# Patient Record
Sex: FEMALE | Race: WHITE | NOT HISPANIC OR LATINO | Employment: FULL TIME | URBAN - METROPOLITAN AREA
[De-identification: names, ages, dates, MRNs, and addresses within clinical notes are randomized per-mention and may not be internally consistent; named-entity substitution may affect disease eponyms.]

---
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
## 2022-08-06 ENCOUNTER — HOSPITAL ENCOUNTER (EMERGENCY)
Facility: HOSPITAL | Age: 22
Discharge: HOME | End: 2022-08-06
Attending: EMERGENCY MEDICINE
Payer: COMMERCIAL

## 2022-08-06 VITALS
OXYGEN SATURATION: 97 % | SYSTOLIC BLOOD PRESSURE: 108 MMHG | TEMPERATURE: 98.4 F | RESPIRATION RATE: 16 BRPM | WEIGHT: 115.08 LBS | DIASTOLIC BLOOD PRESSURE: 65 MMHG | HEART RATE: 70 BPM

## 2022-08-06 DIAGNOSIS — R55 NEAR SYNCOPE: Primary | ICD-10-CM

## 2022-08-06 LAB
ALBUMIN SERPL BCP-MCNC: 4.2 G/DL (ref 3.5–5)
ALP SERPL-CCNC: 50 U/L (ref 46–116)
ALT SERPL W P-5'-P-CCNC: 16 U/L (ref 12–78)
AMPHETAMINES SERPL QL SCN: NEGATIVE
ANION GAP SERPL CALCULATED.3IONS-SCNC: 6 MMOL/L (ref 4–13)
AST SERPL W P-5'-P-CCNC: 10 U/L (ref 5–45)
ATRIAL RATE: 61 BPM
BACTERIA UR QL AUTO: ABNORMAL /HPF
BARBITURATES UR QL: NEGATIVE
BASOPHILS # BLD AUTO: 0.01 THOUSANDS/ΜL (ref 0–0.1)
BASOPHILS NFR BLD AUTO: 0 % (ref 0–1)
BENZODIAZ UR QL: NEGATIVE
BILIRUB SERPL-MCNC: 0.56 MG/DL (ref 0.2–1)
BILIRUB UR QL STRIP: NEGATIVE
BUN SERPL-MCNC: 9 MG/DL (ref 5–25)
CALCIUM SERPL-MCNC: 8.9 MG/DL (ref 8.3–10.1)
CHLORIDE SERPL-SCNC: 105 MMOL/L (ref 96–108)
CLARITY UR: CLEAR
CO2 SERPL-SCNC: 25 MMOL/L (ref 21–32)
COCAINE UR QL: NEGATIVE
COLOR UR: YELLOW
CREAT SERPL-MCNC: 0.76 MG/DL (ref 0.6–1.3)
EOSINOPHIL # BLD AUTO: 0.02 THOUSAND/ΜL (ref 0–0.61)
EOSINOPHIL NFR BLD AUTO: 0 % (ref 0–6)
ERYTHROCYTE [DISTWIDTH] IN BLOOD BY AUTOMATED COUNT: 12.2 % (ref 11.6–15.1)
EXT PREG TEST URINE: NEGATIVE
EXT. CONTROL ED NAV: NORMAL
GFR SERPL CREATININE-BSD FRML MDRD: 113 ML/MIN/1.73SQ M
GLUCOSE SERPL-MCNC: 102 MG/DL (ref 65–140)
GLUCOSE UR STRIP-MCNC: NEGATIVE MG/DL
HCT VFR BLD AUTO: 38.7 % (ref 34.8–46.1)
HGB BLD-MCNC: 13.4 G/DL (ref 11.5–15.4)
HGB UR QL STRIP.AUTO: NEGATIVE
IMM GRANULOCYTES # BLD AUTO: 0.02 THOUSAND/UL (ref 0–0.2)
IMM GRANULOCYTES NFR BLD AUTO: 0 % (ref 0–2)
KETONES UR STRIP-MCNC: NEGATIVE MG/DL
LEUKOCYTE ESTERASE UR QL STRIP: ABNORMAL
LYMPHOCYTES # BLD AUTO: 1.39 THOUSANDS/ΜL (ref 0.6–4.47)
LYMPHOCYTES NFR BLD AUTO: 27 % (ref 14–44)
MAGNESIUM SERPL-MCNC: 2.1 MG/DL (ref 1.6–2.6)
MCH RBC QN AUTO: 32.3 PG (ref 26.8–34.3)
MCHC RBC AUTO-ENTMCNC: 34.6 G/DL (ref 31.4–37.4)
MCV RBC AUTO: 93 FL (ref 82–98)
METHADONE UR QL: NEGATIVE
MONOCYTES # BLD AUTO: 0.38 THOUSAND/ΜL (ref 0.17–1.22)
MONOCYTES NFR BLD AUTO: 7 % (ref 4–12)
NEUTROPHILS # BLD AUTO: 3.33 THOUSANDS/ΜL (ref 1.85–7.62)
NEUTS SEG NFR BLD AUTO: 66 % (ref 43–75)
NITRITE UR QL STRIP: NEGATIVE
NON-SQ EPI CELLS URNS QL MICRO: ABNORMAL /HPF
NRBC BLD AUTO-RTO: 0 /100 WBCS
OPIATES UR QL SCN: NEGATIVE
OXYCODONE+OXYMORPHONE UR QL SCN: NEGATIVE
P AXIS: 76 DEGREES
PCP UR QL: NEGATIVE
PH UR STRIP.AUTO: 7 [PH] (ref 4.5–8)
PHOSPHATE SERPL-MCNC: 3.4 MG/DL (ref 2.7–4.5)
PLATELET # BLD AUTO: 182 THOUSANDS/UL (ref 149–390)
PMV BLD AUTO: 11 FL (ref 8.9–12.7)
POTASSIUM SERPL-SCNC: 3.6 MMOL/L (ref 3.5–5.3)
PR INTERVAL: 112 MS
PROT SERPL-MCNC: 7.5 G/DL (ref 6.4–8.4)
PROT UR STRIP-MCNC: NEGATIVE MG/DL
QRS AXIS: 88 DEGREES
QRSD INTERVAL: 82 MS
QT INTERVAL: 402 MS
QTC INTERVAL: 404 MS
RBC # BLD AUTO: 4.15 MILLION/UL (ref 3.81–5.12)
RBC #/AREA URNS AUTO: ABNORMAL /HPF
SODIUM SERPL-SCNC: 136 MMOL/L (ref 135–147)
SP GR UR STRIP.AUTO: 1.02 (ref 1–1.03)
T WAVE AXIS: 65 DEGREES
THC UR QL: POSITIVE
UROBILINOGEN UR QL STRIP.AUTO: 1 E.U./DL
VENTRICULAR RATE: 61 BPM
WBC # BLD AUTO: 5.15 THOUSAND/UL (ref 4.31–10.16)
WBC #/AREA URNS AUTO: ABNORMAL /HPF

## 2022-08-06 PROCEDURE — 85025 COMPLETE CBC W/AUTO DIFF WBC: CPT | Performed by: EMERGENCY MEDICINE

## 2022-08-06 PROCEDURE — 81025 URINE PREGNANCY TEST: CPT | Performed by: EMERGENCY MEDICINE

## 2022-08-06 PROCEDURE — 80307 DRUG TEST PRSMV CHEM ANLYZR: CPT | Performed by: EMERGENCY MEDICINE

## 2022-08-06 PROCEDURE — 93010 ELECTROCARDIOGRAM REPORT: CPT | Performed by: INTERNAL MEDICINE

## 2022-08-06 PROCEDURE — 80053 COMPREHEN METABOLIC PANEL: CPT | Performed by: EMERGENCY MEDICINE

## 2022-08-06 PROCEDURE — 84100 ASSAY OF PHOSPHORUS: CPT | Performed by: EMERGENCY MEDICINE

## 2022-08-06 PROCEDURE — 99284 EMERGENCY DEPT VISIT MOD MDM: CPT

## 2022-08-06 PROCEDURE — 83735 ASSAY OF MAGNESIUM: CPT | Performed by: EMERGENCY MEDICINE

## 2022-08-06 PROCEDURE — 99285 EMERGENCY DEPT VISIT HI MDM: CPT | Performed by: EMERGENCY MEDICINE

## 2022-08-06 PROCEDURE — 81001 URINALYSIS AUTO W/SCOPE: CPT

## 2022-08-06 PROCEDURE — 96365 THER/PROPH/DIAG IV INF INIT: CPT

## 2022-08-06 PROCEDURE — 93005 ELECTROCARDIOGRAM TRACING: CPT

## 2022-08-06 PROCEDURE — 36415 COLL VENOUS BLD VENIPUNCTURE: CPT | Performed by: EMERGENCY MEDICINE

## 2022-08-06 RX ADMIN — SODIUM CHLORIDE, SODIUM LACTATE, POTASSIUM CHLORIDE, AND CALCIUM CHLORIDE 1000 ML: .6; .31; .03; .02 INJECTION, SOLUTION INTRAVENOUS at 12:02

## 2022-08-06 NOTE — ED PROVIDER NOTES
Pt Name: Milagro Ryan  MRN: [id number]  [place] 2000  Age/Sex: 23 y o  female  Date of evaluation: 8/6/2022  PCP: Dave Daily, [address] [place] [address]    Chief Complaint   Patient presents with    Syncope     [facility] to [facility]  Pt reports losing balance when she stands up too fast  Pt reports [facility] reports E[facility] abnormality  Pt denies any pain  Pt reports episodes more than three times a day  Alert and oriented         HPI    Rome Chiang presents to the Emergency Department complaining of several episodes of near syncope  [facility] and then [facility] to [facility] for further evaluation  She has been feeling like she is going to pass out when off and on several times a day  She reports "I not really awake but Im not on the floor either"  HPI      Past Medical and Surgical History    Past Medical History:   Diagnosis Date    Asthma     Headache        Past Surgical History:   Procedure Laterality Date    CHOLECYSTECTOMY      EYE SURGERY         History reviewed  No pertinent family history  Social History     Tobacco Use    Smoking status: Former Smoker    Smokeless tobacco: Never Used   Vaping Use    Vaping Use: Every day    Substances: Nicotine, THC   Substance Use Topics    Alcohol use: Not Currently    Drug use: Yes     Types: Marijuana     Comment: daily           Allergies    Allergies   Allergen Reactions    Lexapro [Escitalopram] Shortness Of Breath       Home Medications    Prior to Admission medications    Medication Sig Start Date End Date Taking?  Authorizing Provider   albuterol (Ventolin HFA) 90 mcg/act inhaler Inhale 2 puffs every 4 (four) hours as needed for wheezing 3/14/21   Darrold Shone , DO   butalbital-aspirin-caffeine [facility]) -40 mg per capsule Take 1 capsule by mouth every 8 (eight) hours as needed for headaches    Historical Provider, MD   cyclobenzaprine (FLEXERIL) 5 mg tablet TAKE 1 TABLET BY MOUTH AT BEDTIME AS NEEDED  12/5/16   Historical Provider, MD   fluticasone (Flovent HFA) 110 MCG/ACT inhaler Inhale 2 puffs 2 (two) times a day 3/14/21   Leelee Rolon DO   naproxen (NAPROSYN) 375 mg tablet Take 375 mg by mouth 2 (two) times a day 12/16/16 3/14/21  Historical Provider, MD           Review of Systems    Review of Systems   Constitutional: Negative for chills and fever  HENT: Negative for ear pain and sore throat  Eyes: Negative for pain and visual disturbance  Respiratory: Negative for cough and shortness of breath  Cardiovascular: Negative for chest pain and palpitations  Gastrointestinal: Negative for abdominal pain and vomiting  Genitourinary: Negative for dysuria and hematuria  Musculoskeletal: Negative for arthralgias and back pain  Skin: Negative for color change and rash  Neurological: Positive for dizziness and light-headedness  Negative for seizures and syncope  All other systems reviewed and are negative  Physical Exam      ED Triage Vitals [08/06/22 1112]   Temperature Pulse Respirations Blood Pressure SpO2   98 4 °F (36 9 °C) 101 16 119/77 95 %      Temp Source Heart Rate Source Patient Position - Orthostatic VS BP Location FiO2 (%)   Oral Monitor Sitting Right arm --      Pain Score       No Pain               Physical Exam  Vitals and nursing note reviewed  Constitutional:       General: She is not in acute distress  Appearance: She is well-developed  HENT:      Head: Normocephalic and atraumatic  Eyes:      Conjunctiva/sclera: Conjunctivae normal    Cardiovascular:      Rate and Rhythm: Normal rate and regular rhythm  Heart sounds: No murmur heard  Pulmonary:      Effort: Pulmonary effort is normal  No respiratory distress  Breath sounds: Normal breath sounds  Abdominal:      Palpations: Abdomen is soft  Tenderness: There is no abdominal tenderness  Musculoskeletal:      Cervical back: Neck supple     Skin: General: Skin is warm and dry  Neurological:      Mental Status: She is alert  Assessment and Plan    Leigh Curling is a 23 y o  female who presents with near syncope  Physical examination unremarkable  Plan will be to perform diagnostic testing and treat symptomatically  MDM    Diagnostic Results    EKG INTERPRETATION  EKG Interpretation    EKG interpreted by me  Interpretation by Reji Howell DO  EKG reviewed and interpreted independently      Labs:    Results for orders placed or performed during the hospital encounter of 08/06/22   CBC and differential   Result Value Ref Range    WBC 5 15 4 31 - 10 16 Thousand/uL    RBC 4 15 3 81 - 5 12 Million/uL    Hemoglobin 13 4 11 5 - 15 4 g/dL    Hematocrit 38 7 34 8 - 46 1 %    MCV 93 82 - 98 fL    MCH 32 3 26 8 - 34 3 pg    MCHC 34 6 31 4 - 37 4 g/dL    RDW 12 2 11 6 - 15 1 %    MPV 11 0 8 9 - 12 7 fL    Platelets 870 324 - 989 Thousands/uL    nRBC 0 /100 WBCs    Neutrophils Relative 66 43 - 75 %    Immat GRANS % 0 0 - 2 %    Lymphocytes Relative 27 14 - 44 %    Monocytes Relative 7 4 - 12 %    Eosinophils Relative 0 0 - 6 %    Basophils Relative 0 0 - 1 %    Neutrophils Absolute 3 33 1 85 - 7 62 Thousands/µL    Immature Grans Absolute 0 02 0 00 - 0 20 Thousand/uL    Lymphocytes Absolute 1 39 0 60 - 4 47 Thousands/µL    Monocytes Absolute 0 38 0 17 - 1 22 Thousand/µL    Eosinophils Absolute 0 02 0 00 - 0 61 Thousand/µL    Basophils Absolute 0 01 0 00 - 0 10 Thousands/µL   Comprehensive metabolic panel   Result Value Ref Range    Sodium 136 135 - 147 mmol/L    Potassium 3 6 3 5 - 5 3 mmol/L    Chloride 105 96 - 108 mmol/L    CO2 25 21 - 32 mmol/L    ANION GAP 6 4 - 13 mmol/L    BUN 9 5 - 25 mg/dL    Creatinine 0 76 0 60 - 1 30 mg/dL    Glucose 102 65 - 140 mg/dL    Calcium 8 9 8 3 - 10 1 mg/dL    AST 10 5 - 45 U/L    ALT 16 12 - 78 U/L    Alkaline Phosphatase 50 46 - 116 U/L    Total Protein 7 5 6 4 - 8 4 g/dL    Albumin 4 2 3 5 - 5 0 g/dL    Total Bilirubin 0 56 0 20 - 1 00 mg/dL    eGFR 113 ml/min/1 73sq m   Rapid drug screen, urine   Result Value Ref Range    Amph/Meth UR Negative Negative    Barbiturate Ur Negative Negative    Benzodiazepine Urine Negative Negative    Cocaine Urine Negative Negative    Methadone Urine Negative Negative    Opiate Urine Negative Negative    PCP Ur Negative Negative    THC Urine Positive (A) Negative    Oxycodone Urine Negative Negative   Phosphorus   Result Value Ref Range    Phosphorus 3 4 2 7 - 4 5 mg/dL   Magnesium   Result Value Ref Range    Magnesium 2 1 1 6 - 2 6 mg/dL   Urine Microscopic   Result Value Ref Range    RBC, UA None Seen None Seen, 2-4 /hpf    WBC, UA 2-4 None Seen, 2-4, 5-60 /hpf    Epithelial Cells Moderate (A) None Seen, Occasional /hpf    Bacteria, UA Moderate (A) None Seen, Occasional /hpf   POCT pregnancy, urine   Result Value Ref Range    EXT PREG TEST UR (Ref: Negative) Negative     Control Valid    ECG 12 lead   Result Value Ref Range    Ventricular Rate 61 BPM    Atrial Rate 61 BPM    VT Interval 112 ms    QRSD Interval 82 ms    QT Interval 402 ms    QTC Interval 404 ms    P [place] 76 degrees    QRS Axis 88 degrees    T Wave Axis 65 degrees   Urine Macroscopic, POC   Result Value Ref Range    Color, UA Yellow     Clarity, UA Clear     pH, UA 7 0 4 5 - 8 0    Leukocytes, UA Small (A) Negative    Nitrite, UA Negative Negative    Protein, UA Negative Negative mg/dl    Glucose, UA Negative Negative mg/dl    Ketones, UA Negative Negative mg/dl    Urobilinogen, UA 1 0 0 2, 1 0 E U /dl E U /dl    Bilirubin, UA Negative Negative    Occult Blood, UA Negative Negative    Specific Gravity, UA 1 025 1 003 - 1 030       All labs reviewed and utilized in the medical decision making process    Radiology:    No orders to display       All radiology studies independently viewed by me and interpreted by the radiologist     Procedure    Procedures      ED Course of Care and Re-Assessments        Medications   lactated ringers bolus 1,000 mL (0 mL Intravenous Stopped 8/6/22 1329)           FINAL IMPRESSION    Final diagnoses:   Near syncope         DISPOSITION/PLAN      Time reflects when diagnosis was documented in both MDM as applicable and the Disposition within this note     Time User Action Codes Description Comment    8/6/2022  1:13 PM Madiha Dre Add [R55] Near syncope       ED Disposition     ED Disposition   Discharge    Condition   Stable    Date/Time   Sat Aug 6, 2022  1:13 PM    [facility] discharge to home/self care                 Follow-up Information     Follow up With Specialties Details Why Contact Info Additional Information    Wilfredo Bullock DO Family Medicine Schedule an appointment as soon as possible for a visit   [address] [facility] [address] [facility]  [address] Cardiology Schedule an appointment as soon as possible for a visit   [facility] [address]  [address], [place], [address] [phone]            PATIENT REFERRED TO:    Wilfredo Bullock DO  [address]  [address] [place] [address] [address] [facility]  [phone]    Schedule an appointment as soon as possible for a visit       [facility] [address] [phone]  Schedule an appointment as soon as possible for a visit         DISCHARGE MEDICATIONS:    Discharge Medication List as of 8/6/2022  1:13 PM      CONTINUE these medications which have NOT CHANGED    Details   albuterol (Ventolin HFA) 90 mcg/act inhaler Inhale 2 puffs every 4 (four) hours as needed for wheezing, Starting Sun 3/14/2021, Print      butalbital-aspirin-caffeine [facility]) -40 mg per capsule Take 1 capsule by mouth every 8 (eight) hours as needed for headaches, Until Discontinued, Historical Med cyclobenzaprine (FLEXERIL) 5 mg tablet TAKE 1 TABLET BY MOUTH AT BEDTIME AS NEEDED , Historical Med      fluticasone (Flovent HFA) 110 MCG/ACT inhaler Inhale 2 puffs 2 (two) times a day, Starting Sun 3/14/2021, Print      naproxen (NAPROSYN) 375 mg tablet Take 375 mg by mouth 2 (two) times a day, Starting Fri 12/16/2016, Until Sun 3/14/2021, Historical Med             No discharge procedures on file           Savannah Stallworth, [address] [place] [address], DO  08/06/22 2219

## 2023-12-19 ENCOUNTER — OCCMED (OUTPATIENT)
Dept: URGENT CARE | Facility: CLINIC | Age: 23
End: 2023-12-19

## 2023-12-19 DIAGNOSIS — Z02.1 PRE-EMPLOYMENT EXAMINATION: Primary | ICD-10-CM

## 2024-01-05 ENCOUNTER — OCCMED (OUTPATIENT)
Dept: URGENT CARE | Facility: CLINIC | Age: 24
End: 2024-01-05

## 2024-01-05 DIAGNOSIS — Z02.1 PRE-EMPLOYMENT EXAMINATION: Primary | ICD-10-CM

## 2024-06-06 ENCOUNTER — OCCMED (OUTPATIENT)
Dept: URGENT CARE | Facility: CLINIC | Age: 24
End: 2024-06-06

## 2024-06-06 DIAGNOSIS — Z00.00 ENCOUNTER FOR ANNUAL PHYSICAL EXAM: Primary | ICD-10-CM

## 2024-10-03 DIAGNOSIS — Z00.6 ENCOUNTER FOR EXAMINATION FOR NORMAL COMPARISON OR CONTROL IN CLINICAL RESEARCH PROGRAM: ICD-10-CM

## 2024-10-05 ENCOUNTER — APPOINTMENT (OUTPATIENT)
Dept: LAB | Facility: CLINIC | Age: 24
End: 2024-10-05

## 2024-10-05 DIAGNOSIS — Z00.6 ENCOUNTER FOR EXAMINATION FOR NORMAL COMPARISON OR CONTROL IN CLINICAL RESEARCH PROGRAM: ICD-10-CM

## 2024-10-05 PROCEDURE — 36415 COLL VENOUS BLD VENIPUNCTURE: CPT

## 2024-12-12 LAB
APOB+LDLR+PCSK9 GENE MUT ANL BLD/T: NOT DETECTED
BRCA1+BRCA2 DEL+DUP + FULL MUT ANL BLD/T: NOT DETECTED
GENE DIS ANL INTERP-IMP: POSITIVE
MLH1+MSH2+MSH6+PMS2 GN DEL+DUP+FUL M: ABNORMAL

## 2024-12-13 ENCOUNTER — RESULTS FOLLOW-UP (OUTPATIENT)
Dept: LAB | Facility: HOSPITAL | Age: 24
End: 2024-12-13

## 2024-12-13 ENCOUNTER — TELEPHONE (OUTPATIENT)
Dept: GENETICS | Facility: CLINIC | Age: 24
End: 2024-12-13

## 2024-12-13 DIAGNOSIS — R89.8 ABNORMAL GENETIC TEST: Primary | ICD-10-CM

## 2024-12-13 NOTE — TELEPHONE ENCOUNTER
I called and spoke to the patient to schedule a telephone helix result on 12/26/2024 11:00 AM with Ester.

## 2024-12-13 NOTE — TELEPHONE ENCOUNTER
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 She would like a referral to a genetic counselor. A referral was placed today.

## 2024-12-26 ENCOUNTER — OFFICE VISIT (OUTPATIENT)
Dept: GENETICS | Facility: CLINIC | Age: 24
End: 2024-12-26

## 2024-12-26 DIAGNOSIS — Z15.01 PMS2 GENE MUTATION POSITIVE: Primary | ICD-10-CM

## 2024-12-26 DIAGNOSIS — Z15.09 PMS2 GENE MUTATION POSITIVE: Primary | ICD-10-CM

## 2024-12-26 NOTE — PROGRESS NOTES
Post-Test Genetic Counseling Consult Note    Patient Name: Deja Inman   /Age: 2000/24 y.o.    Date of Service: 2024  Genetic Counselor: Ester Raphael [place] [facility] [place]  Interpretation Services: None  Location: Telephone consult   Length of Visit: 30 Minutes    Deja presents today for a consult regarding her DNA Answers test results.    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     Deja reported limited contact w/ many relatives and has limited information regarding family history on both sides     Maternal Family History:  Non-contributory     Paternal Family History:  Grandmother: lung cancer diagnosed at 68, smoker (d.68)    Please refer to the scanned pedigree in the Media Tab for a complete family history     *All history is reported as provided by the patient; records are not available for review, except where indicated.     DNA Answers Result:  Deja underwent genetic testing through the [facility] DNA Answers [facility] health research program. As a part of this program, 11 genes associated with hereditary breast and ovarian cancer (HBOC) syndrome, Farr syndrome and familial hypercholesterolemia (FH) were tested. Deja's result returned positive for a pathogenic variant in the PMS2 gene. During today's visit, Deja and I reviewed the cancer risks associated with her result, and the recommendations for screening and management.    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 The PMS2 gene is associated with autosomal dominant Farr syndrome (LS) (MedGen UID: 486320) and autosomal recessive Constitutional MMR deficiency (CMMRD) syndrome (MedGen  UID:[id number]).      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     Farr syndrome  Risks for both Men & Women   Men and women with a single PMS2 pathogenic variant have an 8.7-20% lifetime risk (to age 80) of developing colorectal cancer (CRC). The average age of onset for CRC is 61-66 years.     There are also other, less well studied, risks associated with Farr syndrome, including gastric, renal pelvis and/or ureter, bladder, small bowel, pancreas, billiary tract, prostate, breast and brain cancers. Studies on pancreatic, prostate and breast cancer have found risks similar to those of the general population, however these risks are still considered increased.    The frequency of benign and malignant skin tumors such as sebaceous adenomas, sebaceous adenocarcinomas, and keratoacanthomas has been reported to be increased among patients with Farr syndrome.  The cumulative lifetime risk specific to the PMS2 carriers is not available.       Risks for Women  Women have a 13-26% lifetime risk for endometrial cancer, with an average diagnosis at age 49-50 years, as well as a possible increased risk for ovarian cancer, although there is conflicting evidence. At present, national guidelines state that PMS2 carriers may have up to a 3% lifetime risk for ovarian cancer however studies looking at risks specific to the PMS2 gene have not shown a statistically significant increased risk for ovarian cancer.       Reproductive Risks:  If an individual and their partner have a pathogenic variant in one copy of the PMS2 gene, there is a 25% chance that a child would inherit a pathogenic variant in both copies of the PMS2 gene.  Pathogenic variants in both copies of the PMS2 gene cause a more severe genetic condition called Constitutional MMR deficiency (CMMRD) syndrome (PMID: 06699034).     CMMRD syndrome is characterized by childhood-onset of colorectal, hematologic, and brain/CNS cancers. Individuals also have distinct  skin features called café au lait macules (PMID: 47655644).     Relatives of reproductive age may consider testing for the PMS2 pathogenic variant for reproductive purposes. Anyone who tests positive for the familial variant may consider testing their reproductive partner for reproductive purposes. Carrier couples can consult a prenatal genetic counselor to discuss their reproductive options.     Note: there are 5 genes associated with Farr syndrome, but both parents must be carriers of the same gene to have a child with CMMRD syndrome.    Risks and Testing for Family Members:  This test result may help clarify the risk for other family members to develop cancer. There is a 50% chance all first-degree relatives (parents, siblings and children) inherited the PMS2  pathogenic variant.  Other relatives such as aunts, uncles and cousins may also be at risk.  Testing is not recommended for relatives under the age of 18, as there are no childhood cancer risks known to be associated with a single pathogenic variant in this gene.    Since it is not known with one-hundred percent certainty which side of the family this PMS2 pathogenic variant came from, we recommend that Deja share this test results with extended family members from both sides of her family.   If  Deja's relatives have any questions or are interested in testing they can reach out to the main Genetics number at [phone] for additional information.        Management:  Management guidelines for individuals with a pathogenic or likely pathogenic PMS2 variant have been developed by the National Comprehensive Cancer Network (https://www.nccn.org/guidelines/category_2).  The recommendations listed below are specific to Deja and are are based on recommendations in the the NCCN guidelines as of  12/26/24. These recommendations are subject to change over time and the newest guidelines should be referenced for the most up to date recommendations.       Plan:    Colon Cancer Screening:  Colonoscopy at age 30-35 y or 2-5 y prior to the earliest colon cancer if it is diagnosed before age 30 y and repeat every 1-3y.  There are data to suggest that the use of 600mg/daily aspirin for at least 2y decreases colon cancer risk, but studies are ongoing investigating the optimal dose and duration.  The panel recommends that all individuals with Farr syndrome who have a future risk of colorectal cancer (excluding those with prior total proctocolectomy) consider using daily aspirin to reduce their future risk of colorectal cancer.  The dose chosen should be made on an individual basis including discussions on risks, benefits, adverse affects and childbearing plans.      Gynecological Cancer Screening  Endometrial Cancer:  PMS2 carriers appear to be at a moderately increased risk for uterine cancer compared to women with pathogenic variants in the other Farr-syndrome genes    Because endometrial cancer can often be detected early based on symptoms, women should be educated regarding the importance of prompt reporting and evaluation of any abnormal uterine bleeding or postmenopausal bleeding. The evaluation of these symptoms should include endometrial biopsy.   Hysterectomy has not been shown to reduce endometrial cancer mortality, but can reduce the incidence of endometrial cancer. Therefore, hysterectomy is a risk-reducing option that can be considered.   Timing of hysterectomy can be individualized based on whether childbearing is complete, comorbidities, family history, and LS gene pathogenic variant, as risks for endometrial cancer vary pathogenic variant. Hysterectomy with bilateral salpingo-oophorectomy may be considered at age 50y  Endometrial cancer screening does not have proven benefit in women with LS. However, endometrial biopsy is both highly sensitive and highly specific as a diagnostic procedure. Screening via endometrial biopsy ever 1 to 2 years starting at  age 30-35 y can be considered.   Transvaginal ultrasound to screen for endometrial cancer in postmenopausal women has not been shown to be sufficiently sensitive or specific as to support a positive recommendation, but may be considered at the clinician's discretion. Transvaginal ultrasound is not recommended as a screening tool in premenopausal women due to the wide range of endometrial stripe thickness throughout the normal menstrual cycle.    Ovarian Cancer:  Insufficient evidence exists to make a specific recommendation for risk-reducing salpingo-oophorectomy (RS) in PMS2 pathogenic variant carriers.  PMS2 pathogenic carries appear to be at no greater risk than average risk for ovarian cancer, and may consider deferring surveillance and may reasonable elect not to have an oophorectomy.     Bilateral salpingo-oophorectomy (BSO) may reduce the incidence of ovarian cancer. The decision to have a BSO as a risk-reducing option should be individualized. Timing of BSO should be individualized based on whether childbearing is complete, menopause status, comorbidities, family history and Farr syndrome gene variant, as risks for ovarian cancer vary by mutated gene. Hysterectomy with bilateral salpingo-oophorectomy may be considered at age 50y. Estrogen replacement after premenopausal oophorectomy may be consider.    Consider risk reduction agents for endometrial and ovarian cancers, including oral contraceptive pills and progestin intrauterine systems.    Other Extracolonic Cancers  Gastric and small bowel cancer:  Upper GI surveillance with high-quality EGD starting at age 30-40 years and repeat every 2-4 years, preferably in conjunction with colonoscopy.  Age of initiation prior to 30 years and/or surveillance interval less than 2 years may be considered based on family history of upper GI cancers or high-risk endoscopic findings (such as incomplete or extensive GIM, gastric or duodenal adenomas, or Velasco esophagus  with dysplasia). Random biopsy of proximal and distal stomach should be at minimum performed on the initial procedure to assess for H. pylori (with treatment indicated if H. pylori is detected), autoimmune gastritis, or intestinal metaplasia. Push enteroscopy can be considered in place of EGD to enhance small bowel visualization, although the yield of push enteroscopy over EGD in LS remains uncertain.   Individuals not undergoing upper endoscopic surveillance should have a one-time noninvasive testing for H. pylori at the time of the LS diagnosis, with treatment indicated if H. pylori is detected. The value of eradication for prevention of gastric cancer in LS is unknown.     Urothelial Cancer:  There is no clear evidence to support surveillance for urothelial cancers in LS. Surveillance may be considered in selected individuals such as with a family history of urothelial cancer or individuals with MSH2 pathogenic variants (especially males) as these groups appear to be at higher risk. Surveillance options may include annual urinalysis starting at ages 30-35 y. However, there is insufficient evidence to recommend a particular surveillance strategy.     Central nervous system (CNS) cancer:  Patients should promptly report any signs and/or neurological symptoms to their health care providers.    Pancreatic Cancer:  PMS2 carriers have not been shown to be at an increased risk for pancreatic cancer.    Patients with a family history of pancreatic cancer should be managed based on careful assessment and clinical judgement.    See NCCN Guidelines for Genetic/Familial High-Risk Assessment: Breast, Ovarian and Pancreatic for additional details on pancreatic cancer screening.      Breast Cancer:  There have been suggestions that there is an increased risk for breast cancer in LS patients; however, there is not enough evidence to support increased screening above average-risk breast cancer screening recommendations or those  based on personal/family history of breast cancer. See NCCN Guidelines for Breast Cancer Screening and Diagnosis         Skin Cancer:  Frequency of benign and malignant skin tumors such as sebaceous adenomas, sebaceous carcinomas, and keratoacanthomas is reported to be increased among patients with Farr syndrome however the frequency and lifetime risks are uncertain.  Further an elevated risk of sebaceous tumors and keratoacanthomas has not been documented for PMS2 carriers.    Consider skin examinations every 1-2 years with a health care provider skilled in identifying Farr syndrome associated skin manifestations.  The age to begin skin cancer screening in uncertain and can be individualized.     Additional Testing:  Deja and KELSIE reviewed that the testing performed in this study assessed for 7 cancer risk-related genes. Additional cancer risk genes are available for diagnostic testing. Based on Deja's reported family history  she does not meet additional criteria, but would like to pursue additional testing from an investigative perspective. We reviewed the limitations fo genetic testing, possible test results (positive, negative, VUS), and the implications of an additional positive result, including the impact on screening outlined above. We reviewed that diagnostic testing is run through insurance.     Plan: A B.I. request was submitted to [facility]. We will contact Deja with her estimated OOP and discuss testing options at that time.     Positive Result: Deja was strongly encouraged to follow up on with our office on an annual basis to review the most up to date guidelines as recommendations are subject to change over time.

## 2024-12-31 ENCOUNTER — TELEPHONE (OUTPATIENT)
Dept: GENETICS | Facility: CLINIC | Age: 24
End: 2024-12-31

## 2024-12-31 DIAGNOSIS — Z80.1 FHX: LUNG CANCER: Primary | ICD-10-CM

## 2024-12-31 NOTE — TELEPHONE ENCOUNTER
Shared results of the [facility] B.I. with Deja. Informed her the estimated OOP cost is $0.  Order for [facility] Multicancer Panel placed.

## 2025-01-13 ENCOUNTER — TELEPHONE (OUTPATIENT)
Dept: GENETICS | Facility: CLINIC | Age: 25
End: 2025-01-13

## 2025-01-13 ENCOUNTER — RESULTS FOLLOW-UP (OUTPATIENT)
Dept: GENETICS | Facility: CLINIC | Age: 25
End: 2025-01-13

## 2025-01-13 LAB
GENE DIS ANL INTERP-IMP: POSITIVE
GENES TESTED: ABNORMAL
INTERPRETATION METHODS AND LIMITATIONS: ABNORMAL
Lab: ABNORMAL
SEQUENCING LOCATION: ABNORMAL

## 2025-01-13 NOTE — TELEPHONE ENCOUNTER
Post-Test Genetic Counseling Consult Note    Today I left a voicemail for Deja reviewing the results of her genetic test for hereditary cancer. We met previously on 12/26/2024 to review the results of her [facility] molecular screen.  I encouraged Deja to call [phone] to discuss this result further.  A copy of this consult note and genetic test result will be shared with the patient.      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  Please see Ester Raphael's consult note from 12/26/24 for management recommendations.     A variant of uncertain significance (VUS) means that a change was identified in a specific gene but it cannot be determined whether the variant is associated with an increased risk of cancer or is a harmless genetic change. The significance of the  MBD4  variant is currently not known and therefore this test result cannot be used to help determine Deja's cancer risks.      It is possible that the variant was seen in only a handful of individuals, or there may be conflicting or incomplete information in the medical literature about the variant and its association with hereditary cancer.     The laboratory will continue to  accumulate information on this variant and will reclassify it as either a positive or negative genetic test result when they are confident that they have adequate information. It is important to note that the majority of variants of uncertain significance are reclassified as likely benign or benign as additional information about the variant becomes available.     Risks and Testing for Family Members:  Please see Ester Raphael's consult note from 12/26/24 for PMS2 testing recommendations.     Genetic testing for the MBD4 variant is not recommended for relatives who wish to determine their cancer risks for purposes of determining medical management. The presence or absence of this variant in a relative is not clinically meaningful unless the variant is reclassified in the future.     Additional Information:  A healthy lifestyle will improve overall health and reduce risk for illness. Eating a healthy diet and exercising for 4 hours per week is recommended. Both diet and exercise have been shown to help maintain a healthy weight. Postmenopausal women who are overweight are at higher risk for breast cancer. Moderate to heavy alcohol use can increase the risk for some cancers. Smoking cigarettes can also increase risk for breast, lung, prostate, pancreatic and other cancers.      Plan:   There are no additional recommendations based on Deja's multicancer test result. she should continue Farr syndrome  screening and medical management as reviewed on 12/26/24.    VUS Result: Deja was strongly encouraged to contact us regarding any changes in her personal or family history of cancer as these changes could alter our recommendation regarding genetic testing and/or cancer screening. Deja was also encouraged to follow up with us on an annual basis as variant classifications are subject to change.